# Patient Record
Sex: MALE | Race: BLACK OR AFRICAN AMERICAN | NOT HISPANIC OR LATINO | Employment: OTHER | URBAN - METROPOLITAN AREA
[De-identification: names, ages, dates, MRNs, and addresses within clinical notes are randomized per-mention and may not be internally consistent; named-entity substitution may affect disease eponyms.]

---

## 2023-05-25 ENCOUNTER — OFFICE VISIT (OUTPATIENT)
Dept: NEUROLOGY | Facility: CLINIC | Age: 78
End: 2023-05-25
Payer: MEDICARE

## 2023-05-25 VITALS
HEART RATE: 62 BPM | HEIGHT: 71 IN | DIASTOLIC BLOOD PRESSURE: 71 MMHG | SYSTOLIC BLOOD PRESSURE: 192 MMHG | WEIGHT: 192.25 LBS | BODY MASS INDEX: 26.91 KG/M2

## 2023-05-25 DIAGNOSIS — G20.A1 PARKINSON'S DISEASE: ICD-10-CM

## 2023-05-25 DIAGNOSIS — I10 HYPERTENSION, UNSPECIFIED TYPE: ICD-10-CM

## 2023-05-25 PROCEDURE — 99999 PR PBB SHADOW E&M-NEW PATIENT-LVL II: ICD-10-PCS | Mod: PBBFAC,,, | Performed by: PSYCHIATRY & NEUROLOGY

## 2023-05-25 PROCEDURE — 99215 OFFICE O/P EST HI 40 MIN: CPT | Mod: S$PBB,,, | Performed by: PSYCHIATRY & NEUROLOGY

## 2023-05-25 PROCEDURE — 99999 PR PBB SHADOW E&M-NEW PATIENT-LVL II: CPT | Mod: PBBFAC,,, | Performed by: PSYCHIATRY & NEUROLOGY

## 2023-05-25 PROCEDURE — 99202 OFFICE O/P NEW SF 15 MIN: CPT | Mod: PBBFAC | Performed by: PSYCHIATRY & NEUROLOGY

## 2023-05-25 PROCEDURE — 99215 PR OFFICE/OUTPT VISIT, EST, LEVL V, 40-54 MIN: ICD-10-PCS | Mod: S$PBB,,, | Performed by: PSYCHIATRY & NEUROLOGY

## 2023-05-25 RX ORDER — CARBIDOPA AND LEVODOPA 25; 100 MG/1; MG/1
1 TABLET ORAL 3 TIMES DAILY
Qty: 90 TABLET | Refills: 11 | Status: SHIPPED | OUTPATIENT
Start: 2023-05-25 | End: 2024-05-24

## 2023-05-25 NOTE — PROGRESS NOTES
MOVEMENT DISORDERS CLINIC NEW CONSULT NOTE    PCP/Referring Provider: Aaareferral Self  No address on file  Date of Service: 5/25/2023    Chief Complaint: PD, neuropathy    HPI: Crow Griffith is a R HANDED 77 y.o. male with a medical issues significant for HTN, prostate cance on leupron, neuropathy coming for PD workup. He noticed 1 year ago L foot started dragging. Next, FOG of the L foot. Slow gait decline. He can walk 40 feet before he tires. He could walk a mile 3 years ago. Tremor began 6 mos ago. He's fallen 6 times in the last year. Handwriting is small and shaky.     No assist device  No fam hx Pdism    EMG shows neuropathy  DATscan POS    Medication history:  -He's been on 10/100mg sinemet BID 9am and 3pm    Neuroleptic exposure:  None    PD Review of Symptoms:  Anosmia: none  Dysarthria/Hypophonia: Moderate  Dysphagia/Sialorrhea: none  Depression: yes  Cognitive slowing: yes - he feels he's had sisues pre PDism  Hallucinations: None  Urinary changes: none  Constipation: YES  Orthostasis: at times   Dyskinesia: none  Falls: yes  Freezing: yes  Micrographia: yes  Sleep issues:  -RBD: none    Review of Systems:   Review of Systems   Constitutional:  Negative for fever.   HENT:  Negative for congestion.    Eyes:  Negative for double vision.   Respiratory:  Negative for cough and shortness of breath.    Cardiovascular:  Negative for chest pain and leg swelling.   Gastrointestinal:  Negative for nausea.   Genitourinary:  Negative for dysuria.   Musculoskeletal:  Positive for falls.   Skin:  Negative for rash.   Neurological:  Positive for tremors and speech change. Negative for headaches.   Psychiatric/Behavioral:  Positive for depression and memory loss. Negative for hallucinations.        Current Medications:  Outpatient Encounter Medications as of 5/25/2023   Medication Sig Dispense Refill    carbidopa-levodopa  mg (SINEMET)  mg per tablet Take 1 tablet by mouth 3 (three) times daily. 90  "tablet 11     No facility-administered encounter medications on file as of 5/25/2023.       Past Medical History:  Patient Active Problem List   Diagnosis    Parkinson's disease    HTN (hypertension)       Past Surgical History:  No past surgical history on file.    Social:  Social History     Socioeconomic History    Marital status:        Family History:  No family history on file.    PHYSICAL:  BP (!) 192/71   Pulse 62   Ht 5' 11" (1.803 m)   Wt 87.2 kg (192 lb 3.9 oz)   BMI 26.81 kg/m²     General Medical Examination:  General: Good hygiene, appropriate appearance.  HEENT: Normocephalic, atraumatic.   Neck: Supple.   Chest: Unlabored breathing.   CV: Symmetric pulses.   Ext: No clubbing, cyanosis, or edema.     Mental Status:  Mood/Affect: Appropriate/congruent.  Level of consciousness: Awake, alert.  Orientation: Oriented to person, place, time and situation.  Language: No Dysarthria    Cranial nerves:  I: Not tested  II: PERRL, VFF to counting  III, IV, VI: EOMI with conjugate gaze and no nystagmus on end gaze  V: Facial sensation intact and symmetric over the bilateral V1-V3  VII: Facial muscle activation intact and symmetric over the bilateral upper and lower face  VIII: Hearing intact in the b/l ears and symmetrical to finger rub  IX, X, XII: TUP midline - no atrophy or fasiculations  X: SCMs and shoulder shrug full strength b/l and symmetric    Motor:   -UE: 5/5 deltoids; 5/5 biceps, triceps; 5/5 wrist flexors, extensors; 5/5 interosseous; 5/5   -LEs: 5/5 hip flexion, extension; 5/5 knee flexion, extension; 5/5 ankle flexion, extension      DTRs:  ? Biceps Triceps Brachioradialis Knee Ankle   Left 2+   1+    Right 2+   1+        ? Finger taps Finger flicks ELÍAS Heel taps   Left 0 0 0 0   Right 1+ 1+ 1+ 1+     Neck tone: 0  ? Arm Leg   Left 0 0   Right 0 0     Sensation:   -Light touch: Intact and symmetric in the bilateral upper and lower extremities.      Gait:  -Arises from chair with use " of hands.  -Stoop is mild  -Casual gait is: narrow based  -Stride length: mildly shirt-antalgic  -Arm Swing: absent R  -Turnin steps  -FOG: None      Laboratory Data:  NA    Imaging:  NA    Assessment//Plan:   Problem List Items Addressed This Visit          Neuro    Parkinson's disease    Current Assessment & Plan     Typical PDism  He appears under dosed  Suggested carbidopa/levodopa 25/100mg 1 tab PO TID               Cardiac/Vascular    HTN (hypertension)    Current Assessment & Plan     BP persistently over 190's systolic (checked 3 times)  Suggested ER visit for management                Yari Watson MD, MS  [facility]  Department of Neurology  Movement Disorders